# Patient Record
Sex: MALE | Race: WHITE | NOT HISPANIC OR LATINO | Employment: FULL TIME | URBAN - METROPOLITAN AREA
[De-identification: names, ages, dates, MRNs, and addresses within clinical notes are randomized per-mention and may not be internally consistent; named-entity substitution may affect disease eponyms.]

---

## 2023-09-11 ENCOUNTER — APPOINTMENT (OUTPATIENT)
Dept: MRI IMAGING | Facility: HOSPITAL | Age: 38
End: 2023-09-11
Attending: EMERGENCY MEDICINE
Payer: COMMERCIAL

## 2023-09-11 ENCOUNTER — HOSPITAL ENCOUNTER (EMERGENCY)
Facility: HOSPITAL | Age: 38
Discharge: HOME | End: 2023-09-12
Attending: EMERGENCY MEDICINE | Admitting: EMERGENCY MEDICINE
Payer: COMMERCIAL

## 2023-09-11 ENCOUNTER — APPOINTMENT (OUTPATIENT)
Dept: CT IMAGING | Facility: HOSPITAL | Age: 38
End: 2023-09-11
Attending: EMERGENCY MEDICINE
Payer: COMMERCIAL

## 2023-09-11 DIAGNOSIS — M54.50 ACUTE LOW BACK PAIN WITHOUT SCIATICA, UNSPECIFIED BACK PAIN LATERALITY: ICD-10-CM

## 2023-09-11 LAB
ALBUMIN SERPL BCG-MCNC: 4.6 G/DL (ref 3.5–5.2)
ALBUMIN UR-MCNC: NEGATIVE MG/DL
ALP SERPL-CCNC: 77 U/L (ref 40–129)
ALT SERPL W P-5'-P-CCNC: 11 U/L (ref 0–70)
ANION GAP SERPL CALCULATED.3IONS-SCNC: 13 MMOL/L (ref 7–15)
APPEARANCE UR: CLEAR
AST SERPL W P-5'-P-CCNC: 23 U/L (ref 0–45)
BASOPHILS # BLD AUTO: 0.1 10E3/UL (ref 0–0.2)
BASOPHILS NFR BLD AUTO: 1 %
BILIRUB SERPL-MCNC: 0.4 MG/DL
BILIRUB UR QL STRIP: NEGATIVE
BUN SERPL-MCNC: 17.3 MG/DL (ref 6–20)
CALCIUM SERPL-MCNC: 9.7 MG/DL (ref 8.6–10)
CHLORIDE SERPL-SCNC: 99 MMOL/L (ref 98–107)
COLOR UR AUTO: COLORLESS
CREAT SERPL-MCNC: 1.1 MG/DL (ref 0.67–1.17)
CRP SERPL-MCNC: 31.3 MG/L
DEPRECATED HCO3 PLAS-SCNC: 26 MMOL/L (ref 22–29)
EGFRCR SERPLBLD CKD-EPI 2021: 89 ML/MIN/1.73M2
EOSINOPHIL # BLD AUTO: 0 10E3/UL (ref 0–0.7)
EOSINOPHIL NFR BLD AUTO: 0 %
ERYTHROCYTE [DISTWIDTH] IN BLOOD BY AUTOMATED COUNT: 12.2 % (ref 10–15)
GLUCOSE SERPL-MCNC: 106 MG/DL (ref 70–99)
GLUCOSE UR STRIP-MCNC: NEGATIVE MG/DL
HCT VFR BLD AUTO: 45.3 % (ref 40–53)
HGB BLD-MCNC: 15 G/DL (ref 13.3–17.7)
HGB UR QL STRIP: NEGATIVE
HOLD SPECIMEN: NORMAL
IMM GRANULOCYTES # BLD: 0.1 10E3/UL
IMM GRANULOCYTES NFR BLD: 1 %
KETONES UR STRIP-MCNC: NEGATIVE MG/DL
LEUKOCYTE ESTERASE UR QL STRIP: NEGATIVE
LYMPHOCYTES # BLD AUTO: 1.4 10E3/UL (ref 0.8–5.3)
LYMPHOCYTES NFR BLD AUTO: 11 %
MCH RBC QN AUTO: 29.3 PG (ref 26.5–33)
MCHC RBC AUTO-ENTMCNC: 33.1 G/DL (ref 31.5–36.5)
MCV RBC AUTO: 89 FL (ref 78–100)
MONOCYTES # BLD AUTO: 0.8 10E3/UL (ref 0–1.3)
MONOCYTES NFR BLD AUTO: 6 %
NEUTROPHILS # BLD AUTO: 10.8 10E3/UL (ref 1.6–8.3)
NEUTROPHILS NFR BLD AUTO: 81 %
NITRATE UR QL: NEGATIVE
NRBC # BLD AUTO: 0 10E3/UL
NRBC BLD AUTO-RTO: 0 /100
PH UR STRIP: 6 [PH] (ref 5–7)
PLATELET # BLD AUTO: 578 10E3/UL (ref 150–450)
POTASSIUM SERPL-SCNC: 4 MMOL/L (ref 3.4–5.3)
PROT SERPL-MCNC: 7.7 G/DL (ref 6.4–8.3)
RBC # BLD AUTO: 5.12 10E6/UL (ref 4.4–5.9)
RBC URINE: <1 /HPF
SODIUM SERPL-SCNC: 138 MMOL/L (ref 136–145)
SP GR UR STRIP: 1.01 (ref 1–1.03)
UROBILINOGEN UR STRIP-MCNC: <2 MG/DL
WBC # BLD AUTO: 13.2 10E3/UL (ref 4–11)
WBC URINE: <1 /HPF

## 2023-09-11 PROCEDURE — 72158 MRI LUMBAR SPINE W/O & W/DYE: CPT

## 2023-09-11 PROCEDURE — 258N000003 HC RX IP 258 OP 636: Performed by: EMERGENCY MEDICINE

## 2023-09-11 PROCEDURE — 81001 URINALYSIS AUTO W/SCOPE: CPT | Performed by: EMERGENCY MEDICINE

## 2023-09-11 PROCEDURE — 250N000011 HC RX IP 250 OP 636: Mod: JZ | Performed by: EMERGENCY MEDICINE

## 2023-09-11 PROCEDURE — 36415 COLL VENOUS BLD VENIPUNCTURE: CPT | Performed by: STUDENT IN AN ORGANIZED HEALTH CARE EDUCATION/TRAINING PROGRAM

## 2023-09-11 PROCEDURE — 74177 CT ABD & PELVIS W/CONTRAST: CPT

## 2023-09-11 PROCEDURE — 85025 COMPLETE CBC W/AUTO DIFF WBC: CPT | Performed by: EMERGENCY MEDICINE

## 2023-09-11 PROCEDURE — 255N000002 HC RX 255 OP 636: Mod: JZ | Performed by: EMERGENCY MEDICINE

## 2023-09-11 PROCEDURE — 80053 COMPREHEN METABOLIC PANEL: CPT | Performed by: EMERGENCY MEDICINE

## 2023-09-11 PROCEDURE — 86140 C-REACTIVE PROTEIN: CPT | Performed by: EMERGENCY MEDICINE

## 2023-09-11 PROCEDURE — 99285 EMERGENCY DEPT VISIT HI MDM: CPT | Mod: 25

## 2023-09-11 PROCEDURE — 51798 US URINE CAPACITY MEASURE: CPT

## 2023-09-11 PROCEDURE — A9585 GADOBUTROL INJECTION: HCPCS | Mod: JZ | Performed by: EMERGENCY MEDICINE

## 2023-09-11 PROCEDURE — 96360 HYDRATION IV INFUSION INIT: CPT | Mod: 59

## 2023-09-11 RX ORDER — GADOBUTROL 604.72 MG/ML
6.5 INJECTION INTRAVENOUS ONCE
Status: COMPLETED | OUTPATIENT
Start: 2023-09-11 | End: 2023-09-11

## 2023-09-11 RX ORDER — IOPAMIDOL 755 MG/ML
75 INJECTION, SOLUTION INTRAVASCULAR ONCE
Status: COMPLETED | OUTPATIENT
Start: 2023-09-11 | End: 2023-09-11

## 2023-09-11 RX ADMIN — GADOBUTROL 6.5 ML: 604.72 INJECTION INTRAVENOUS at 22:54

## 2023-09-11 RX ADMIN — IOPAMIDOL 75 ML: 755 INJECTION, SOLUTION INTRAVENOUS at 18:00

## 2023-09-11 RX ADMIN — SODIUM CHLORIDE 1000 ML: 9 INJECTION, SOLUTION INTRAVENOUS at 17:10

## 2023-09-11 ASSESSMENT — ENCOUNTER SYMPTOMS
CHILLS: 1
DIAPHORESIS: 1
ABDOMINAL PAIN: 1
CONSTIPATION: 1
VOMITING: 0
BACK PAIN: 1
WEAKNESS: 1
FEVER: 1
NAUSEA: 0

## 2023-09-11 ASSESSMENT — ACTIVITIES OF DAILY LIVING (ADL)
ADLS_ACUITY_SCORE: 35

## 2023-09-11 NOTE — ED TRIAGE NOTES
Pt has had right abdominal pain, low back pain for the last few weeks. Ibuprofen  helps the pain.  Pt also developed left hand 3rd and 4th finger swelling and pain about 2 weeks ago. Pt went to  urgent care on 9/8 for these sx and chest pain. His ave titer count was high that could indicate an autoimmune disease. He's here today as it was hard to get out of bed today due to the low back pain. No nausea ,vomiting or diarrhea. Has had some constipation in the last 2 weeks but did have a bm today.

## 2023-09-11 NOTE — ED PROVIDER NOTES
EMERGENCY DEPARTMENT ENCOUNTER      NAME: Jordin Napoles  AGE: 38 year old male  YOB: 1985  MRN: [id number]  EVALUATION DATE & TIME: No admission date for patient encounter.    PCP: No primary care provider on file.    ED PROVIDER: Delilah Hightower DO      Chief Complaint   Patient presents with    Abdominal Pain         FINAL IMPRESSION:  1. Acute low back pain without sciatica, unspecified back pain laterality    2. Urinary retention          ED COURSE & MEDICAL DECISION MAKING:    Pertinent Labs & Imaging studies reviewed. (See chart for details)  4:30 PM I met the patient and performed my initial interview and exam.  6:36 PM I rechecked and updated the patient. He urinated after his CT scan, denies any dysuria.   7:08 PM Patient's bladder scan had 482 mL of urine in his bladder. Sounds like this was done less than an hour after CT after he urinated  7:34 PM Nursing did a post void residual.  38 mL    38 year old male presents to the Emergency Department for evaluation of abdominal pain, back pain.  Patient notes symptoms have been ongoing for some time.  Seem to have more pain and weakness today.  He was seen at the emergency room last month with labs that showed elevated white blood cell count and normal chest x-ray.  His primary care provider had labs drawn as patient has been having the pain and some swelling in his joints in his hand.  His LISA titer was positive.  CRP was elevated as well as white blood cell count.  He has not yet followed up with them.  No family history of any inflammatory arthritis.  Unable to relate what makes the pain better or worse.  Feels generally weak.  No signs or symptoms of a cauda equina.  Not consistent with epidural abscess or hematoma.  He is not on any daily medications and no history of IV drug use.  No recent surgeries.  Will obtain CT imaging of the abdomen pelvis to evaluate for appendicitis, cholecystitis, ureterolithiasis among others.  He is  nontoxic-appearing.  Labs with leukocytosis and persistent elevation in his CRP.  CT imaging shows no obvious cause of symptoms, however he does have significant bladder distention causes mild prominence of the renal collecting system.  Patient did urinate after his CT scan.  Bladder scan showed 482 mL of urine still in his bladder.  UA without evidence of infection.  No concern for STI.  Patient is ambulatory here however certainly concerning with his back pain and urinary retention.  Will obtain MRI imaging of his lumbar spine.  Rechecked patient.  Confirms most of his pain to lower back.  Was seen on 8/25 with noted thoracic pain.  Denies any midline thoracic pain.  At time had left flank pain.  Nothing above T12 level.  Pending MRI at time of hand off.    At the conclusion of the encounter I discussed the results of all of the tests and the disposition. The questions were answered. The patient or family acknowledged understanding and was agreeable with the care plan.     Medical Decision Making    History:  Supplemental history from: Documented in chart, if applicable  External Record(s) reviewed: Documented in chart, if applicable.    Work Up:  Chart documentation includes differential considered and any EKGs or imaging independently interpreted by provider, where specified.  In additional to work up documented, I considered the following work up: Documented in chart, if applicable.    External consultation:  Discussion of management with another provider: Documented in chart, if applicable    Complicating factors:  Care impacted by chronic illness: N/A  Care affected by social determinants of health: N/A    Disposition considerations: Admission considered. Patient was signed out to the oncoming physician, disposition pending.        0 minutes of critical care time     MEDICATIONS GIVEN IN THE EMERGENCY:  Medications   0.9% sodium chloride BOLUS (0 mLs Intravenous Stopped 9/11/23 1819)   iopamidol (ISOVUE-370)  solution 75 mL (75 mLs Intravenous $Given 9/11/23 1800)       NEW PRESCRIPTIONS STARTED AT TODAY'S ER VISIT  New Prescriptions    No medications on file          =================================================================    HPI    Patient information was obtained from: patient     Use of : N/A         Jordin Napoles is a 38 year old male with no pertinent history who presents to this ED via personal vehicle for evaluation of abdominal pain.     Patient reports increased pain in his right lower quadrant and mid to lower back for a while. He notes it took him 30 minutes to get out of bed this morning due to the pain. He endorses relief when taking ibuprofen. Denies history of surgeries.     Patient endorses left finger swelling starting ~2 weeks ago. Denies trauma or history of arthritis.     Patient went to Urgent Care on 8/25 because he noticed some chest pain when he blew his nose. He had a negative chest x-ray and was encouraged to follow-up with PCP. When he saw his PCP on 9/7 for follow-up, his WBC, CRP, and LISA titer were elevated.     Endorses night sweats, fever, chills, constipation for 2 months, and bilateral leg weakness.     Denies urinary symptoms or vomiting.     Patient notes he occasionally smokes a cigar.       REVIEW OF SYSTEMS   Review of Systems   Constitutional:  Positive for chills, diaphoresis and fever.   Gastrointestinal:  Positive for abdominal pain (RLQ) and constipation. Negative for nausea and vomiting.   Musculoskeletal:  Positive for back pain (mid to lower).   Neurological:  Positive for weakness (leg weakness bilaterally).        PAST MEDICAL HISTORY:  History reviewed. No pertinent past medical history.    PAST SURGICAL HISTORY:  History reviewed. No pertinent surgical history.        CURRENT MEDICATIONS:    No current outpatient medications on file.       ALLERGIES:  No Known Allergies    FAMILY HISTORY:  History reviewed. No pertinent family history.    SOCIAL  "HISTORY:        VITALS:  /75   Pulse 101   Temp 97.1  F (36.2  C) (Tympanic)   Resp 12   Ht 1.778 m (5' 10\")   Wt 65.8 kg (145 lb)   SpO2 100%   BMI 20.81 kg/m      PHYSICAL EXAM    Physical Exam  Constitutional:       General: He is not in acute distress.  HENT:      Head: Normocephalic and atraumatic.      Mouth/Throat:      Pharynx: Oropharynx is clear.   Eyes:      Pupils: Pupils are equal, round, and reactive to light.   Cardiovascular:      Rate and Rhythm: Normal rate and regular rhythm.      Pulses: Normal pulses.      Heart sounds: Normal heart sounds.   Pulmonary:      Effort: Pulmonary effort is normal.      Breath sounds: Normal breath sounds.   Abdominal:      General: Abdomen is flat. Bowel sounds are normal.      Palpations: Abdomen is soft.      Tenderness: There is no abdominal tenderness.   Musculoskeletal:         General: Normal range of motion.      Comments: Swelling to joints of left hand   Skin:     General: Skin is warm and dry.      Capillary Refill: Capillary refill takes less than 2 seconds.   Neurological:      General: No focal deficit present.      Mental Status: He is alert and oriented to person, place, and time.      Cranial Nerves: Cranial nerves 2-12 are intact.      Sensory: Sensation is intact.      Motor: Motor function is intact.      Coordination: Coordination is intact.      Gait: Gait is intact.             LAB:  All pertinent labs reviewed and interpreted.  Labs Ordered and Resulted from Time of ED Arrival to Time of ED Departure   COMPREHENSIVE METABOLIC PANEL - Abnormal       Result Value    Sodium 138      Potassium 4.0      Chloride 99      Carbon Dioxide (CO2) 26      Anion Gap 13      Urea Nitrogen 17.3      Creatinine 1.10      Calcium 9.7      Glucose 106 (*)     Alkaline Phosphatase 77      AST 23      ALT 11      Protein Total 7.7      Albumin 4.6      Bilirubin Total 0.4      GFR Estimate 89     CRP INFLAMMATION - Abnormal    CRP Inflammation 31.30 " (*)    CBC WITH PLATELETS AND DIFFERENTIAL - Abnormal    WBC Count 13.2 (*)     RBC Count 5.12      Hemoglobin 15.0      Hematocrit 45.3      MCV 89      MCH 29.3      MCHC 33.1      RDW 12.2      Platelet Count 578 (*)     % Neutrophils 81      % Lymphocytes 11      % Monocytes 6      % Eosinophils 0      % Basophils 1      % Immature Granulocytes 1      NRBCs per 100 WBC 0      Absolute Neutrophils 10.8 (*)     Absolute Lymphocytes 1.4      Absolute Monocytes 0.8      Absolute Eosinophils 0.0      Absolute Basophils 0.1      Absolute Immature Granulocytes 0.1      Absolute NRBCs 0.0     ROUTINE UA WITH MICROSCOPIC REFLEX TO CULTURE - Normal    Color Urine Colorless      Appearance Urine Clear      Glucose Urine Negative      Bilirubin Urine Negative      Ketones Urine Negative      Specific Gravity Urine 1.014      Blood Urine Negative      pH Urine 6.0      Protein Albumin Urine Negative      Urobilinogen Urine <2.0      Nitrite Urine Negative      Leukocyte Esterase Urine Negative      RBC Urine <1      WBC Urine <1         RADIOLOGY:  Reviewed all pertinent imaging. Please see official radiology report.  CT Abdomen Pelvis w Contrast   Final Result   IMPRESSION:    1.  Significant bladder distention.    2.  No CT evidence of appendicitis.      MR Lumbar Spine w/o & w Contrast    (Results Pending)       I, Faiza Marin, am serving as a scribe to document services personally performed by Dr. Delilah Hightower based on my observation and the provider's statements to me. IDelilah, DO attest that Faiza Marin is acting in a scribe capacity, has observed my performance of the services and has documented them in accordance with my direction.    Delilah Hightower DO  Emergency Medicine  [facility] EMERGENCY DEPARTMENT  [facility] [facility] [id number] [phone]  Dept: [phone]       Delilah Hightower DO  09/13/23 0967

## 2023-09-12 VITALS
HEART RATE: 100 BPM | DIASTOLIC BLOOD PRESSURE: 72 MMHG | HEIGHT: 70 IN | RESPIRATION RATE: 16 BRPM | BODY MASS INDEX: 20.76 KG/M2 | SYSTOLIC BLOOD PRESSURE: 123 MMHG | WEIGHT: 145 LBS | TEMPERATURE: 97.1 F | OXYGEN SATURATION: 100 %

## 2023-09-12 RX ORDER — METHYLPREDNISOLONE 4 MG
TABLET, DOSE PACK ORAL
Qty: 21 TABLET | Refills: 0 | Status: SHIPPED | OUTPATIENT
Start: 2023-09-12

## 2023-09-12 NOTE — ED NOTES
EMERGENCY DEPARTMENT SIGN OUT NOTE        ED COURSE AND MEDICAL DECISION MAKING  Patient was signed out to me by Dr Haley Hightower at 8:50 PM     In brief, Jordin Napoles is a 38 year old male who initially presented with ongoing abdominal and back pain.  Please see prior providers note for full history and physical exam and prior emergency department course.  There was reportedly some concern for urinary retention however when the patient again was checked with a true postvoid residual he only had 93 ml at that time.  He states he has been urinating without difficulty and has no dysuria, frequency, urgency, or hematuria.  Reportedly he has been undergoing an autoimmune work-up.  Prior physician ordered MRI of the lumbar spine which is pending at the time of signout.  CT of the abdomen and pelvis with contrast reveals a distended bladder prior to urinating.  Otherwise no acute pathology.  Per prior physician, he has had a slightly elevated white blood cell count previously which is part of his autoimmune/rheumatologic work-up.  His CRP is also been somewhat elevated which is here today as well.  Urinalysis without any sign of hematuria or UTI.  No sign of pyelonephritis on exam.  Denies any IV drug use or red flag symptoms for back pain.  Does report right lower back pain that radiates sometimes around into his hip and buttock.  No neurologic symptoms and no neurologic deficit on exam.    MRI of the lumbar spine reveals minor degenerative changes.  No significant spinal canal stenosis or neural foraminal narrowing at any level.  No sign of epidural abscess.  Patient reports his pain is well controlled at this time.  Thus do feel he can be discharged and follow-up with his primary care provider who is continuing to perform outpatient autoimmune work-up.  Prior physician recommended a Medrol Dosepak which was ordered for the patient and he is in agreement with this plan.  We did discuss discussing this with his primary  care doctor pending the further rheumatologic work-up.  He is comfortable with this.  Advise close follow-up.  He was given indications for return emergency department.  He voiced understanding was comfortable with this plan.  He ambulated here without difficulty and was discharged home in stable condition.      I, Laurie Kelley MD, attest that Audra Pang is acting in a scribe capacity, has observed my performance of the services and has documented them in accordance with my direction.        FINAL IMPRESSION    1. Acute low back pain without sciatica, unspecified back pain laterality        ED MEDS  Medications   0.9% sodium chloride BOLUS (0 mLs Intravenous Stopped 9/11/23 1819)   iopamidol (ISOVUE-370) solution 75 mL (75 mLs Intravenous $Given 9/11/23 1800)   gadobutrol (GADAVIST) injection 6.5 mL (6.5 mLs Intravenous $Given 9/11/23 0874)       LAB  Labs Ordered and Resulted from Time of ED Arrival to Time of ED Departure   COMPREHENSIVE METABOLIC PANEL - Abnormal       Result Value    Sodium 138      Potassium 4.0      Chloride 99      Carbon Dioxide (CO2) 26      Anion Gap 13      Urea Nitrogen 17.3      Creatinine 1.10      Calcium 9.7      Glucose 106 (*)     Alkaline Phosphatase 77      AST 23      ALT 11      Protein Total 7.7      Albumin 4.6      Bilirubin Total 0.4      GFR Estimate 89     CRP INFLAMMATION - Abnormal    CRP Inflammation 31.30 (*)    CBC WITH PLATELETS AND DIFFERENTIAL - Abnormal    WBC Count 13.2 (*)     RBC Count 5.12      Hemoglobin 15.0      Hematocrit 45.3      MCV 89      MCH 29.3      MCHC 33.1      RDW 12.2      Platelet Count 578 (*)     % Neutrophils 81      % Lymphocytes 11      % Monocytes 6      % Eosinophils 0      % Basophils 1      % Immature Granulocytes 1      NRBCs per 100 WBC 0      Absolute Neutrophils 10.8 (*)     Absolute Lymphocytes 1.4      Absolute Monocytes 0.8      Absolute Eosinophils 0.0      Absolute Basophils 0.1      Absolute Immature Granulocytes 0.1       Absolute NRBCs 0.0     ROUTINE UA WITH MICROSCOPIC REFLEX TO CULTURE - Normal    Color Urine Colorless      Appearance Urine Clear      Glucose Urine Negative      Bilirubin Urine Negative      Ketones Urine Negative      Specific Gravity Urine 1.014      Blood Urine Negative      pH Urine 6.0      Protein Albumin Urine Negative      Urobilinogen Urine <2.0      Nitrite Urine Negative      Leukocyte Esterase Urine Negative      RBC Urine <1      WBC Urine <1         EKG  None.    RADIOLOGY    MR Lumbar Spine w/o & w Contrast   Final Result   IMPRESSION:   1.  Minor degenerative changes as described.   2.  No significant spinal canal stenosis or neural foraminal narrowing at any level.         CT Abdomen Pelvis w Contrast   Final Result   IMPRESSION:    1.  Significant bladder distention.    2.  No CT evidence of appendicitis.          DISCHARGE MEDS  New Prescriptions    METHYLPREDNISOLONE (MEDROL DOSEPAK) 4 MG TABLET THERAPY PACK    Follow Package Directions       Laurie Kelley MD  [facility] EMERGENCY DEPARTMENT  [address] [place] [id number] [phone]       Laurie Kelley MD  09/12/23 0115

## 2023-09-12 NOTE — DISCHARGE INSTRUCTIONS
Follow-up with your primary care doctor as planned.  Can call and discuss steroids with them tomorrow if you would like pending the further rheumatologic studies they were doing.     Return to the ER for any new or worsening concerns including numbness, tingling, weakness numbness in the groin area, loss of control of the bowel or bladder, inability urinate, fever, inability ambulate, or any new or worsening concerns.

## 2023-12-31 ENCOUNTER — HEALTH MAINTENANCE LETTER (OUTPATIENT)
Age: 38
End: 2023-12-31

## 2024-04-29 DIAGNOSIS — R69 DIAGNOSIS UNKNOWN: Primary | ICD-10-CM

## 2025-01-19 ENCOUNTER — HEALTH MAINTENANCE LETTER (OUTPATIENT)
Age: 40
End: 2025-01-19